# Patient Record
Sex: FEMALE | Race: WHITE
[De-identification: names, ages, dates, MRNs, and addresses within clinical notes are randomized per-mention and may not be internally consistent; named-entity substitution may affect disease eponyms.]

---

## 2019-03-10 ENCOUNTER — HOSPITAL ENCOUNTER (INPATIENT)
Dept: HOSPITAL 54 - TELE | Age: 84
LOS: 3 days | Discharge: NURSING, REHAB OR LONG-TERM CARE | DRG: 690 | End: 2019-03-13
Attending: INTERNAL MEDICINE | Admitting: INTERNAL MEDICINE
Payer: MEDICARE

## 2019-03-10 VITALS — WEIGHT: 105 LBS | HEIGHT: 59 IN | BODY MASS INDEX: 21.17 KG/M2

## 2019-03-10 VITALS — SYSTOLIC BLOOD PRESSURE: 150 MMHG | DIASTOLIC BLOOD PRESSURE: 71 MMHG

## 2019-03-10 VITALS — SYSTOLIC BLOOD PRESSURE: 155 MMHG | DIASTOLIC BLOOD PRESSURE: 73 MMHG

## 2019-03-10 VITALS — DIASTOLIC BLOOD PRESSURE: 70 MMHG | SYSTOLIC BLOOD PRESSURE: 150 MMHG

## 2019-03-10 VITALS — DIASTOLIC BLOOD PRESSURE: 76 MMHG | SYSTOLIC BLOOD PRESSURE: 145 MMHG

## 2019-03-10 VITALS — SYSTOLIC BLOOD PRESSURE: 143 MMHG | DIASTOLIC BLOOD PRESSURE: 77 MMHG

## 2019-03-10 DIAGNOSIS — I48.0: ICD-10-CM

## 2019-03-10 DIAGNOSIS — I10: ICD-10-CM

## 2019-03-10 DIAGNOSIS — F11.20: ICD-10-CM

## 2019-03-10 DIAGNOSIS — C50.919: ICD-10-CM

## 2019-03-10 DIAGNOSIS — M79.7: ICD-10-CM

## 2019-03-10 DIAGNOSIS — F43.22: ICD-10-CM

## 2019-03-10 DIAGNOSIS — Z88.1: ICD-10-CM

## 2019-03-10 DIAGNOSIS — N39.0: Primary | ICD-10-CM

## 2019-03-10 DIAGNOSIS — Z85.3: ICD-10-CM

## 2019-03-10 DIAGNOSIS — M19.90: ICD-10-CM

## 2019-03-10 DIAGNOSIS — E87.6: ICD-10-CM

## 2019-03-10 DIAGNOSIS — B96.89: ICD-10-CM

## 2019-03-10 DIAGNOSIS — M48.00: ICD-10-CM

## 2019-03-10 LAB
ALBUMIN SERPL BCP-MCNC: 3.3 G/DL (ref 3.4–5)
ALP SERPL-CCNC: 81 U/L (ref 46–116)
ALT SERPL W P-5'-P-CCNC: 21 U/L (ref 12–78)
AST SERPL W P-5'-P-CCNC: 25 U/L (ref 15–37)
BASOPHILS # BLD AUTO: 0 /CMM (ref 0–0.2)
BASOPHILS NFR BLD AUTO: 0.2 % (ref 0–2)
BILIRUB DIRECT SERPL-MCNC: 0.1 MG/DL (ref 0–0.2)
BILIRUB SERPL-MCNC: 0.5 MG/DL (ref 0.2–1)
BUN SERPL-MCNC: 17 MG/DL (ref 7–18)
CALCIUM SERPL-MCNC: 9.4 MG/DL (ref 8.5–10.1)
CHLORIDE SERPL-SCNC: 106 MMOL/L (ref 98–107)
CHOLEST SERPL-MCNC: 151 MG/DL (ref ?–200)
CO2 SERPL-SCNC: 25 MMOL/L (ref 21–32)
CREAT SERPL-MCNC: 0.7 MG/DL (ref 0.6–1.3)
EOSINOPHIL NFR BLD AUTO: 2.1 % (ref 0–6)
GLUCOSE SERPL-MCNC: 107 MG/DL (ref 74–106)
HCT VFR BLD AUTO: 41 % (ref 39–51)
HDLC SERPL-MCNC: 78 MG/DL (ref 40–60)
HGB BLD-MCNC: 14.1 G/DL (ref 13.5–17.5)
LDLC SERPL DIRECT ASSAY-MCNC: 67 MG/DL (ref 0–99)
LIPASE SERPL-CCNC: 98 U/L (ref 73–393)
LYMPHOCYTES NFR BLD AUTO: 1.4 /CMM (ref 0.8–4.8)
LYMPHOCYTES NFR BLD AUTO: 24 % (ref 20–44)
MAGNESIUM SERPL-MCNC: 2.1 MG/DL (ref 1.8–2.4)
MCHC RBC AUTO-ENTMCNC: 34 G/DL (ref 31–36)
MCV RBC AUTO: 92 FL (ref 80–96)
MONOCYTES NFR BLD AUTO: 0.4 /CMM (ref 0.1–1.3)
MONOCYTES NFR BLD AUTO: 6.5 % (ref 2–12)
NEUTROPHILS # BLD AUTO: 4 /CMM (ref 1.8–8.9)
NEUTROPHILS NFR BLD AUTO: 67.2 % (ref 43–81)
PHOSPHATE SERPL-MCNC: 2.9 MG/DL (ref 2.5–4.9)
PLATELET # BLD AUTO: 229 /CMM (ref 150–450)
POTASSIUM SERPL-SCNC: 3.3 MMOL/L (ref 3.5–5.1)
PROT SERPL-MCNC: 6.3 G/DL (ref 6.4–8.2)
RBC # BLD AUTO: 4.48 MIL/UL (ref 4.5–6)
SODIUM SERPL-SCNC: 142 MMOL/L (ref 136–145)
TRIGL SERPL-MCNC: 65 MG/DL (ref 30–150)
WBC NRBC COR # BLD AUTO: 6 K/UL (ref 4.3–11)

## 2019-03-10 PROCEDURE — G0378 HOSPITAL OBSERVATION PER HR: HCPCS

## 2019-03-10 RX ADMIN — DEXTROSE MONOHYDRATE SCH MLS/HR: 50 INJECTION, SOLUTION INTRAVENOUS at 09:22

## 2019-03-10 RX ADMIN — POTASSIUM CHLORIDE SCH MEQ: 1500 TABLET, EXTENDED RELEASE ORAL at 10:21

## 2019-03-10 RX ADMIN — SODIUM CHLORIDE PRN MLS/HR: 9 INJECTION, SOLUTION INTRAVENOUS at 20:18

## 2019-03-10 RX ADMIN — POTASSIUM CHLORIDE SCH MEQ: 1500 TABLET, EXTENDED RELEASE ORAL at 11:30

## 2019-03-10 RX ADMIN — SODIUM CHLORIDE PRN MLS/HR: 9 INJECTION, SOLUTION INTRAVENOUS at 01:12

## 2019-03-10 RX ADMIN — Medication PRN EACH: at 08:33

## 2019-03-10 RX ADMIN — Medication PRN EACH: at 19:20

## 2019-03-10 RX ADMIN — Medication PRN EACH: at 14:49

## 2019-03-10 RX ADMIN — METOPROLOL TARTRATE SCH MG: 25 TABLET, FILM COATED ORAL at 17:33

## 2019-03-10 RX ADMIN — APIXABAN SCH MG: 2.5 TABLET, FILM COATED ORAL at 19:15

## 2019-03-10 RX ADMIN — ACETAMINOPHEN PRN MG: 325 TABLET ORAL at 10:25

## 2019-03-10 NOTE — NUR
RN INITIAL NOTES:

RECEIVED REPORT FROM FRANCISCO AGUILAR. PT IN BED, AWAKE, A/O X3 ON RA RESPIRATION EVEN AND 
UNLABORED, IV ACCESS PATENT AND FLUSHING WELL, INFUSING WITH NS AT 75ML/HR. DISCUSSED PLAN 
OF CARE TO THE PT. PT RECEIVED PAIN MEDICATION NOT TOO LONG AGO. PT CAN AMBULATE TO RESTROOM 
WITH WALKER. SAFETY PRECAUTIONS FOR FALL INITIATED, CALL LIGHT IN REACH, WILL CONTINUE 
MONITORING PT.

## 2019-03-10 NOTE — NUR
TELE RN ADMISSION NOTES



Received patient from [facility], via [facility] assisted by 2 EMTs, with son and 
daughter-in-law. Admitted to Tele 309-2 due to UTI under the service of [facility]. Pt A/O 
X4, able to ambulate from gurney to bed with maximum assistance. Assisted pt to bed 
comfortably. Admission routine done. On tele monitor with SR. Initial skin assessment done: 
skin intact with bruise L upper arm - patient claimed she has no idea how did she get the 
bruise. With wrist brace on the right noted - patient claimed to keep her r hand awake. Kept 
on bed comfortably. All nursing needs attended. Brought home meds listed and to be delivered 
to pharmacy in AM. Home med recon done - to be F/u with MD. Patient on RA, no 
SOB/respiratory distress noted. Patient denies discomfort at this time. Admission ordered 
noted and carried out. Kept bed low and locked, siderails x2 up, call light at bedside. Will 
continue to monitor accordingly.

## 2019-03-10 NOTE — NUR
RN NOTES

 RECEIVED PATIENT IN THE ROOM. PATIENT A/O X3/4 ON TELE [id number] . PATIENT HAS NO ACUTE 
RESPIRATORY DISTRESS, WAS COMPLAINING OF PAIN GENERALIZED 6/10 PER PER PAIN SCALE. PATIENT 
USING BATHROOM, AMBULATORY, INFUSING NS AT 75 ML/HE INTACT ON LEFT FA. CALL LIGHT WITHIN TO 
REACH, SAFETY PRECAUTION MAINTAINED ALL THE TIME.

## 2019-03-10 NOTE — NUR
RN NOTES

 PATIENT STABLE EATING DINNER, V/S STABLE, MED COMPLAINT. PATIENT USING WALKER TO AMBULATE, 
MONITORING FALL PRECAUTION, IV  ACCESS ON LEFT FA INFUSING NS AT 75 ML/HR. CALL LIGHT WITHIN 
TO REACH, FAMILY NEXT TO THE BED. ENDORSED ONCOMING NURSE FOR PLAN OF CARE.

## 2019-03-10 NOTE — NUR
TELE RN CLOSING NOTES



Patient awake on Whelan's position on bed. With patent peripheral IV line LAC # 20 with Ns @ 
75ml/hr as ordered. Patient afebrile the whole shift, no new complaints made. Noted asleep 
most of the shift. All nursing needs attended. Kept bed low and locked. Call light at 
bedside. Endorsed to the next shift. 

-------------------------------------------------------------------------------

Addendum: 03/10/19 at 0650 by TATYANA BELLA RN

-------------------------------------------------------------------------------

On tele monitor with sinus rhythm and episodes of sinus pravin when asleep.

## 2019-03-10 NOTE — NUR
RN NOTES

 ADMINISTERED NARCO 5/325 MG PO PRN FOR GENERALIZED PAIN 7/10 PER PATIENT REQUEST, V/S 
STABLE.

## 2019-03-10 NOTE — NUR
RN NOTES

 ADMINISTERED NARCO 5/325 MG PO PRN FOR GENERALIZED PAIN 7/10 PER PATIENT REQUEST, V/S 
STABLE, CONTINUED MONITORING.

## 2019-03-10 NOTE — NUR
RN NOTES

 ADMINISTERED NARCO 5/325 MG PO PRN   FOR GENERALIZED PAIN  7/10 PER PATIENT REQUEST, V/S 
TAKEN / 73, P-61. CONTINUED MONITORING,

## 2019-03-11 VITALS — DIASTOLIC BLOOD PRESSURE: 70 MMHG | SYSTOLIC BLOOD PRESSURE: 130 MMHG

## 2019-03-11 VITALS — DIASTOLIC BLOOD PRESSURE: 72 MMHG | SYSTOLIC BLOOD PRESSURE: 163 MMHG

## 2019-03-11 VITALS — SYSTOLIC BLOOD PRESSURE: 144 MMHG | DIASTOLIC BLOOD PRESSURE: 70 MMHG

## 2019-03-11 VITALS — SYSTOLIC BLOOD PRESSURE: 158 MMHG | DIASTOLIC BLOOD PRESSURE: 78 MMHG

## 2019-03-11 LAB
BASOPHILS # BLD AUTO: 0 /CMM (ref 0–0.2)
BASOPHILS NFR BLD AUTO: 0.4 % (ref 0–2)
BUN SERPL-MCNC: 14 MG/DL (ref 7–18)
CALCIUM SERPL-MCNC: 9.5 MG/DL (ref 8.5–10.1)
CHLORIDE SERPL-SCNC: 109 MMOL/L (ref 98–107)
CO2 SERPL-SCNC: 24 MMOL/L (ref 21–32)
CREAT SERPL-MCNC: 0.6 MG/DL (ref 0.6–1.3)
EOSINOPHIL NFR BLD AUTO: 2.5 % (ref 0–6)
GLUCOSE SERPL-MCNC: 109 MG/DL (ref 74–106)
HCT VFR BLD AUTO: 42 % (ref 33–45)
HGB BLD-MCNC: 14.2 G/DL (ref 11.5–14.8)
LYMPHOCYTES NFR BLD AUTO: 1.2 /CMM (ref 0.8–4.8)
LYMPHOCYTES NFR BLD AUTO: 25.8 % (ref 20–44)
MAGNESIUM SERPL-MCNC: 2.1 MG/DL (ref 1.8–2.4)
MCHC RBC AUTO-ENTMCNC: 34 G/DL (ref 31–36)
MCV RBC AUTO: 92 FL (ref 82–100)
MONOCYTES NFR BLD AUTO: 0.3 /CMM (ref 0.1–1.3)
MONOCYTES NFR BLD AUTO: 6.3 % (ref 2–12)
NEUTROPHILS # BLD AUTO: 3.1 /CMM (ref 1.8–8.9)
NEUTROPHILS NFR BLD AUTO: 65 % (ref 43–81)
PHOSPHATE SERPL-MCNC: 2.4 MG/DL (ref 2.5–4.9)
PLATELET # BLD AUTO: 211 /CMM (ref 150–450)
POTASSIUM SERPL-SCNC: 4.1 MMOL/L (ref 3.5–5.1)
RBC # BLD AUTO: 4.55 MIL/UL (ref 4–5.2)
SODIUM SERPL-SCNC: 141 MMOL/L (ref 136–145)
WBC NRBC COR # BLD AUTO: 4.7 K/UL (ref 4.3–11)

## 2019-03-11 RX ADMIN — Medication PRN EACH: at 16:27

## 2019-03-11 RX ADMIN — Medication PRN EACH: at 08:23

## 2019-03-11 RX ADMIN — VITAMIN D, TAB 1000IU (100/BT) SCH UNIT: 25 TAB at 08:23

## 2019-03-11 RX ADMIN — Medication PRN EACH: at 22:48

## 2019-03-11 RX ADMIN — APIXABAN SCH MG: 2.5 TABLET, FILM COATED ORAL at 08:45

## 2019-03-11 RX ADMIN — METOPROLOL TARTRATE SCH MG: 25 TABLET, FILM COATED ORAL at 16:26

## 2019-03-11 RX ADMIN — Medication PRN EACH: at 00:00

## 2019-03-11 RX ADMIN — PANTOPRAZOLE SODIUM SCH MG: 40 TABLET, DELAYED RELEASE ORAL at 08:23

## 2019-03-11 RX ADMIN — METOPROLOL TARTRATE SCH MG: 25 TABLET, FILM COATED ORAL at 08:22

## 2019-03-11 RX ADMIN — Medication SCH MG: at 08:23

## 2019-03-11 RX ADMIN — DEXTROSE MONOHYDRATE SCH MLS/HR: 50 INJECTION, SOLUTION INTRAVENOUS at 08:22

## 2019-03-11 RX ADMIN — LOSARTAN POTASSIUM SCH MG: 50 TABLET, FILM COATED ORAL at 08:23

## 2019-03-11 RX ADMIN — APIXABAN SCH MG: 2.5 TABLET, FILM COATED ORAL at 16:27

## 2019-03-11 NOTE — NUR
MS RN NOTE



PER TERESA SAN, RN AT [facility], HE WILL FAX URINE CX RESULTS SHORTLY.

## 2019-03-11 NOTE — NUR
MS RN NOTE 



CALLED TERESA BRO RN AT [facility], LEFT VOICEMAIL TO FOLLOW UP REGARDING URINE CX RESULTS 
THAT HAVE NOT YET BEEN RECEIVED. LEFT CALL BACK AND 3 WEST FAX NUMBER. AWAITING RESPONSE AND 
RECEIPT OF RECORDS.

## 2019-03-11 NOTE — NUR
PRN NORCO:

PT C/O GENERALIZED Arthritis pain requesting 7/10 requesting for pain medication, prn norco 
5/325 mg tab po administered at this time, will continue to monitor and reassess

## 2019-03-11 NOTE — NUR
MS RN NOTE



THE PT INFORMED THE NURSE THAT THEY ONLY PEOPLE SHE WOULD LIKE TO KNOW ABOUT HER STAY OR 
DISCUSS MEDICAL INFORMATION INCLUDE HER SON SARINA SHEPHERD AND HER GRANDSON GRUPO MONET. SHE 
DOES NOT WANT INFORMATION SHARED WITH HER DAUGHTER IN LAW JAYDE COHEN AT THIS TIME.

## 2019-03-11 NOTE — NUR
MS RN NOTE



AFTER ASSISTING PT TO THE BATHROOM, THE NURSE ATTEMPTED TO RECONNECT PRIMARY IV BUT PT 
STATED SHE DID NOT WANT ANYMORE IV FLUIDS. EDUCATION PROVIDED, PT STILL DECLINED.

## 2019-03-11 NOTE — NUR
Social service consult requested by Adelita Naranjo for pt living alone and POA/Advance 
Directive. Pt is a 89 year old female transferred from [facility] for UTI. Sw met with 
pt at bedside, pt appeared to be clean and well groomed. Pt is alert and oriented x3, pt 
seems to diverge into tangents about past hospital admissions. SW began to explain advance 
directives/POA and pt became upset with . Sw let pt vent and address pts 
concerns, sw provided support and education regarding advance directive and pts choice to 
complete form at her chosen time with either a notary or two witnesses (non relatives or 
hospital staff). SW left blank advance directive/POA paperwork with pt at bedside. Pt 
provide social work with sons number for emergency only, pt requested that her son only be 
contacted during an emergency Tato Janie [phone]. Pt reports her son is going to 
help her get in contact with a psychiatrist. Sw offered pt resources for mental health, pet 
declined. Pt reports she is lives on her own and conducts all her daily activities on her 
own. Pt is requesting to be discharged back home so she may be able to address her future 
move to her choice of assisted livings. No further  needs identified at this 
time. 



SW updated RN with the aforementioned discussion, pts RN informed social work that psych 
consult has been requested.

## 2019-03-11 NOTE — NUR
RN NOTES:

PT REFUSED TO BE DISCONNECTED FROM IVF, PT COMPLAINING THAT IT MAKES HER URINATE FREQUENTLY, 
PT MADE AWARE OF RISK AND BENEFITS OF HYDRATION.

## 2019-03-11 NOTE — NUR
MS RN CLOSING NOTE



PT, SITTING UP IN BED, A/0X4 WITH PERIODS OF AGITATION, AND SUSPICION OF HOSPITAL STAFF. PT 
DENIES CHEST PAIN, SOB, N/V. BREATHING IS EVEN AND UNLABORED ON ROOM AIR. LEFT FA #22G IV IS 
PATENT, CLEAN DRY AND INTACT WITH ORDERS FOR NS @ 75ML/HR HOWEVER PT IS STILL REFUSING IV 
FLUIDS AT THIS TIME. ASSISTED PT WITH ADLS AND TO TURN AND REPOSITION Q2H FOR THE DURATION 
OF THE SHIFT. ALL NEEDS ATTENDED TO. PSYCH CONSULT COMPLETED BY DR. VALDEZ WITH NO NEW 
ORDERS AT THIS TIME. BED IS LOCKED AND IN LOWEST POSITION, SIDE RAILS UP X2, CALL LIGHT AND 
POSSESSIONS WITHIN REACH. WILL ENDORSE TO NIGHT SHIFT NURSE FOR CONTINUITY OF CARE.

## 2019-03-11 NOTE — NUR
MS RN OPENING NOTE



RECEIVED PT, SITTING UP IN BED, A/0X4. PT REQUESTING ASSISTANCE GOING TO THE BATHROOM, NIGHT 
SHIFT NURSE AND CNA ASSISTED PT TO BATHROOM. PT DENIES CHEST PAIN, SOB, N/V. PT IS 
REQUESTING NORCO FOR GENERALIZED PAIN, INFORMED PT THAT THE NURSE WILL REVIEW ORDERED 
MEDICATIONS AND PENDING VS CHECK WILL ADMINISTER NORCO IF DUE. PT VERBALIZED AGREEMENT AND 
UNDERSTANDING. PT ASSISTED BACK TO BED. LEFT FA #22G IV IS INFUSING NS @ 75ML/HR WITHOUT 
REDNESS OR SWELLING. ALL NEEDS ATTENDED TO. BED IS LOCKED AND IN LOWEST POSITION, SIDE RAILS 
UP X2, BED ALARM ON, CALL LIGHT AND POSSESSIONS WITHIN REACH.

## 2019-03-11 NOTE — NUR
MS RN INITIAL NOTES



Patient in bed, awake. Appears irritable, reports she hasn't been seen by the doctor all 
day. IVF not infusing, per report patient is non compliant with IVF and declined education. 
Maintained safety, will cont to monitor.

## 2019-03-11 NOTE — NUR
MS RN NOTE



CALLED TERESA SAN RN AT [facility]  [phone] TO REQUEST CORRECT URINE CULTURE 
RESULTS TO BE FAXED. LEFT VOICEMAIL WITH CALL BACK INFORMATION, AWAITING RESPONSE.

## 2019-03-11 NOTE — NUR
RN CLOSING NOTES:

PT IN BED, AWAKE, REMAINS ON RA, IV ACCESS REMAINS PATENT AND FLUSHING WELL INFUSING WITH NS 
AT 75ML/HR. VS REMAINS STABLE, NEEDS ATTENDED. FOR PSYCH CONSULT.  SAFETY PRECAUTIONS FOR 
FALL REMAINS ENGAGED, CALL LIGHT IN REACH, WILL ENDORSE TO DAY RN FOR CONTINUITY OF CARE.

## 2019-03-11 NOTE — NUR
MS RN NOTE



PER [employer] , THEY ALREADY HAVE THE FACE SHEET FOR PSYCH CONSULT AND WILL INFORM DR. VALDEZ REGARDING PSYCH EVAL ONCE HE ARRIVES TO THE UNIT.

## 2019-03-12 VITALS — DIASTOLIC BLOOD PRESSURE: 76 MMHG | SYSTOLIC BLOOD PRESSURE: 158 MMHG

## 2019-03-12 VITALS — SYSTOLIC BLOOD PRESSURE: 143 MMHG | DIASTOLIC BLOOD PRESSURE: 69 MMHG

## 2019-03-12 LAB
BASOPHILS # BLD AUTO: 0 /CMM (ref 0–0.2)
BASOPHILS NFR BLD AUTO: 0.5 % (ref 0–2)
BUN SERPL-MCNC: 12 MG/DL (ref 7–18)
CALCIUM SERPL-MCNC: 10 MG/DL (ref 8.5–10.1)
CHLORIDE SERPL-SCNC: 107 MMOL/L (ref 98–107)
CO2 SERPL-SCNC: 26 MMOL/L (ref 21–32)
CREAT SERPL-MCNC: 0.6 MG/DL (ref 0.6–1.3)
EOSINOPHIL NFR BLD AUTO: 2.9 % (ref 0–6)
GLUCOSE SERPL-MCNC: 91 MG/DL (ref 74–106)
HCT VFR BLD AUTO: 45 % (ref 33–45)
HGB BLD-MCNC: 14.8 G/DL (ref 11.5–14.8)
LYMPHOCYTES NFR BLD AUTO: 1.4 /CMM (ref 0.8–4.8)
LYMPHOCYTES NFR BLD AUTO: 29 % (ref 20–44)
MAGNESIUM SERPL-MCNC: 2.2 MG/DL (ref 1.8–2.4)
MCHC RBC AUTO-ENTMCNC: 33 G/DL (ref 31–36)
MCV RBC AUTO: 93 FL (ref 82–100)
MONOCYTES NFR BLD AUTO: 0.3 /CMM (ref 0.1–1.3)
MONOCYTES NFR BLD AUTO: 6 % (ref 2–12)
NEUTROPHILS # BLD AUTO: 2.9 /CMM (ref 1.8–8.9)
NEUTROPHILS NFR BLD AUTO: 61.6 % (ref 43–81)
PHOSPHATE SERPL-MCNC: 3.5 MG/DL (ref 2.5–4.9)
PLATELET # BLD AUTO: 201 /CMM (ref 150–450)
POTASSIUM SERPL-SCNC: 4 MMOL/L (ref 3.5–5.1)
RBC # BLD AUTO: 4.82 MIL/UL (ref 4–5.2)
SODIUM SERPL-SCNC: 142 MMOL/L (ref 136–145)
WBC NRBC COR # BLD AUTO: 4.8 K/UL (ref 4.3–11)

## 2019-03-12 RX ADMIN — Medication PRN EACH: at 04:38

## 2019-03-12 RX ADMIN — NITROFURANTOIN MONOHYDRATE AND NITROFURANTOIN, MACROCRYSTALLINE SCH MG: 75; 25 CAPSULE ORAL at 20:28

## 2019-03-12 RX ADMIN — METOPROLOL TARTRATE SCH MG: 25 TABLET, FILM COATED ORAL at 09:00

## 2019-03-12 RX ADMIN — PANTOPRAZOLE SODIUM SCH MG: 40 TABLET, DELAYED RELEASE ORAL at 09:09

## 2019-03-12 RX ADMIN — LOSARTAN POTASSIUM SCH MG: 50 TABLET, FILM COATED ORAL at 09:08

## 2019-03-12 RX ADMIN — VITAMIN D, TAB 1000IU (100/BT) SCH UNIT: 25 TAB at 09:09

## 2019-03-12 RX ADMIN — ACETAMINOPHEN PRN MG: 325 TABLET ORAL at 00:39

## 2019-03-12 RX ADMIN — DEXTROSE MONOHYDRATE SCH MLS/HR: 50 INJECTION, SOLUTION INTRAVENOUS at 08:50

## 2019-03-12 RX ADMIN — APIXABAN SCH MG: 2.5 TABLET, FILM COATED ORAL at 09:33

## 2019-03-12 RX ADMIN — Medication SCH MG: at 09:09

## 2019-03-12 RX ADMIN — NITROFURANTOIN MONOHYDRATE AND NITROFURANTOIN, MACROCRYSTALLINE SCH MG: 75; 25 CAPSULE ORAL at 11:49

## 2019-03-12 RX ADMIN — Medication PRN EACH: at 16:20

## 2019-03-12 RX ADMIN — Medication PRN EACH: at 20:28

## 2019-03-12 RX ADMIN — Medication PRN EACH: at 09:09

## 2019-03-12 RX ADMIN — METOPROLOL TARTRATE SCH MG: 25 TABLET, FILM COATED ORAL at 16:19

## 2019-03-12 RX ADMIN — APIXABAN SCH MG: 2.5 TABLET, FILM COATED ORAL at 16:19

## 2019-03-12 NOTE — NUR
MS/RN   NOTE



THE PATIENT ALERT AND ORIENTED X4. IN ROOM AIR AND SATURATION IS AT 97%. DENIES SOB. 
RESPIRATION REGULAR AND UNLABORED. DENIES PAIN. LFA G 22 PATENT AND SALINE LOCKED. BED LOW 
AND LOCKED. SIDE RAILS UP X2. CALL LIGHT WITHIN REACH. WILL ENDORSE TO NIGHT SHIFT.

## 2019-03-12 NOTE — NUR
RN NOTES



Patient c/o generalized pain, 8/10. Norco 5-325 given as ordered. Will continue to monitor

## 2019-03-12 NOTE — NUR
MS RN CLOSING NOTES



Patient in bed, stable oxygen saturation on RA. IVF infusing at 75ml/hr, tolerating well. 
Ambulates with walker, standby assist. Generalized body pain controlled with PRN Norco. 
Voiding without difficulty, maintained safety. Previous blood culture and urine culture from 
outside hosp. to follow, will endorse to oncoming RN.

## 2019-03-12 NOTE — NUR
MS RN OPENING NOTES



Received patient sitting up in bed, alert, oriented x 4. Breathing even and unlabored. Not 
in any distress. No complaints as of this time. Safety measures in place; call bell within 
reach. Bed in low, locked position. Patient stable as endorsed by the AM nurse. Will 
continue to monitor accordingly

## 2019-03-12 NOTE — NUR
MS/RN   NOTE



THE PATIENT ALERT AND ORIENTED X4. IN ROOM AIR AND DENIES SOB. RESPIRATION REGULAR AND 
UNLABORED. DENIES PAIN. THE PATIENT IN NO APPARENT DISTRESS AT THIS TIME. LFA G 22 PATENT 
AND NORMAL SALINE INFUSING AT 75ML/HR. NO S/S INFILTRATION NOTED. BED LOW AND LOCKED. SIDE 
RAILS UP X2. CALL LIGHT WITHIN REACH. WILL CONTINUE TO MONITOR.

## 2019-03-13 VITALS — SYSTOLIC BLOOD PRESSURE: 147 MMHG | DIASTOLIC BLOOD PRESSURE: 82 MMHG

## 2019-03-13 VITALS — DIASTOLIC BLOOD PRESSURE: 82 MMHG | SYSTOLIC BLOOD PRESSURE: 147 MMHG

## 2019-03-13 VITALS — SYSTOLIC BLOOD PRESSURE: 151 MMHG | DIASTOLIC BLOOD PRESSURE: 82 MMHG

## 2019-03-13 RX ADMIN — Medication SCH MG: at 08:33

## 2019-03-13 RX ADMIN — VITAMIN D, TAB 1000IU (100/BT) SCH UNIT: 25 TAB at 08:32

## 2019-03-13 RX ADMIN — Medication PRN EACH: at 01:27

## 2019-03-13 RX ADMIN — APIXABAN SCH MG: 2.5 TABLET, FILM COATED ORAL at 08:33

## 2019-03-13 RX ADMIN — METOPROLOL TARTRATE SCH MG: 25 TABLET, FILM COATED ORAL at 08:33

## 2019-03-13 RX ADMIN — NITROFURANTOIN MONOHYDRATE AND NITROFURANTOIN, MACROCRYSTALLINE SCH MG: 75; 25 CAPSULE ORAL at 08:32

## 2019-03-13 RX ADMIN — PANTOPRAZOLE SODIUM SCH MG: 40 TABLET, DELAYED RELEASE ORAL at 08:32

## 2019-03-13 RX ADMIN — Medication PRN EACH: at 08:32

## 2019-03-13 RX ADMIN — Medication PRN EACH: at 13:25

## 2019-03-13 RX ADMIN — LOSARTAN POTASSIUM SCH MG: 50 TABLET, FILM COATED ORAL at 08:32

## 2019-03-13 NOTE — NUR
MS RN NOTES



PATIENT FOR DISCHARGE TODAY. TO DISCHARGE TO [facility]. DISCHARGE INSTRUCTIONS AND EDUCATION 
GIVEN TO PATIENT AND VERBALIZED UNDERSTANDING. ALL BELONGINGS COMPLETE, NO REPORT OF MISSING 
INVENTORY. HOME MEDICATIONS RECEIVED FROM PHARMACY AND GIVEN TO PATIENT. PLACED CALL TO 
[facility] AND GAVE REPORT TO STEPHANIE AGUILAR. IV REMOVED WITH MINIMAL BLEEDING NOTED, PRESSURE 
DRESSING PLACED ON SITE. PATIENT LEFT UNIT AT 1400 VIA GURNEY IN STABLE CONDITION. BREATHING 
EVEN AND UNLABORED. NO ACUTE DISTRESS NOTED. PATIENT DENIES ANY PAIN OR DISCOMFORT. MD AWARE 
OF DISCHARGE

## 2019-03-13 NOTE — NUR
RN NOTES



Patient c/o generalized pain, 7/10. [place] 5-325 given as ordered. Will continue to monitor

## 2019-03-13 NOTE — NUR
MS RN NOTES



PATIENT RECEIVED RESTING INSIDE ROOM. AWAKE, ALERT AND ORIENTED X 3, VERBALLY RESPONSIVE AND 
RESPONDS TO VERBAL AND TACTILE STIMULI. BREATHING EVEN AND UNLABORED. NO ACUTE DISTRESS. 
DENIES ANY PAIN OR DISCOMFORT. PATIENT CALM AND RELAXED. MAINTAINED ISOLATION PRECAUTIONS. 
WILL CONTINUE TO MONITOR. BED LOCKED AND IN LOW POSITION. BILATERAL UPPER SIDE RAILS UP AND 
LOCKED. CALL LIGHT WITHIN EASY REACH

## 2019-03-13 NOTE — NUR
RN CLOSING NOTES



Patient in bed, alert, oriented x 3. Not in any distress. No acute changes overnight. All 
needs attended to. All due medications given as ordered. Safety precautions in place; call 
bell within reach. Bed in low, locked position. Will endorse MARGIE to oncoming RN.

## 2019-03-13 NOTE — NUR
MS RN NOTES



WITH ORDER FROM DR. WILSON FOR CIPROFLOXACIN PO. VERIFIED ALLERGY WITH PATIENT. PER PATIENT, 
SHE IS OK TO TAKE CIPRO BUT SHE REFUSES TO HAVE TO NOW AND WANTS TO START HAVING IT TOMORROW 
AS SHE ALREADY HAD ANTIBIOTIC TODAY (NITROFURANTOIN). EXPLAINED TO PATIENT THAT 
NITROFURANTOIN IS A DIFFERENT TYPE OF ANTIBIOTIC AND THAT PER URINE C/S IS SHOWING 
SUSCEPTIBILITY TO CIPRO. PATIENT VERBALIZED UNDERSTANDING BUT STILL INSISTS TO HAVE CIPRO 
TOMORROW. DR. WILSON MADE AWARE. WILL CONTINUE TO MONITOR

## 2023-08-28 NOTE — NUR
MS AGUILAR NOTE



PRELIMINARY URINE CX RESULT RECIEVED FROM [facility]. WILL INFORM DR. JEAN AND PLACE IN 
CHART. 

-------------------------------------------------------------------------------

Addendum: 03/11/19 at 1121 by PADMA HEART RN

-------------------------------------------------------------------------------

FAXED RESULTS ARE FOR A DIFFERENT PATIENT. WILL FOLLOW UP WITH [facility] DEPARTMENT. [Right] : right ankle [There are no fractures, subluxations or dislocations. No significant abnormalities are seen] : There are no fractures, subluxations or dislocations. No significant abnormalities are seen